# Patient Record
Sex: MALE | Race: AMERICAN INDIAN OR ALASKA NATIVE | NOT HISPANIC OR LATINO
[De-identification: names, ages, dates, MRNs, and addresses within clinical notes are randomized per-mention and may not be internally consistent; named-entity substitution may affect disease eponyms.]

---

## 2019-09-27 PROBLEM — Z00.00 ENCOUNTER FOR PREVENTIVE HEALTH EXAMINATION: Status: ACTIVE | Noted: 2019-09-27

## 2019-10-17 ENCOUNTER — APPOINTMENT (OUTPATIENT)
Dept: DERMATOLOGY | Facility: CLINIC | Age: 44
End: 2019-10-17
Payer: COMMERCIAL

## 2019-10-17 VITALS
WEIGHT: 135 LBS | DIASTOLIC BLOOD PRESSURE: 84 MMHG | SYSTOLIC BLOOD PRESSURE: 126 MMHG | HEIGHT: 65 IN | BODY MASS INDEX: 22.49 KG/M2

## 2019-10-17 DIAGNOSIS — L30.9 DERMATITIS, UNSPECIFIED: ICD-10-CM

## 2019-10-17 DIAGNOSIS — Z78.9 OTHER SPECIFIED HEALTH STATUS: ICD-10-CM

## 2019-10-17 DIAGNOSIS — L21.9 SEBORRHEIC DERMATITIS, UNSPECIFIED: ICD-10-CM

## 2019-10-17 DIAGNOSIS — L82.1 OTHER SEBORRHEIC KERATOSIS: ICD-10-CM

## 2019-10-17 PROCEDURE — 99203 OFFICE O/P NEW LOW 30 MIN: CPT

## 2019-10-17 RX ORDER — KETOCONAZOLE 20.5 MG/ML
2 SHAMPOO, SUSPENSION TOPICAL
Qty: 1 | Refills: 11 | Status: ACTIVE | COMMUNITY
Start: 2019-10-17 | End: 1900-01-01

## 2019-10-17 RX ORDER — CLOBETASOL PROPIONATE 0.5 MG/G
0.05 AEROSOL, FOAM TOPICAL
Qty: 1 | Refills: 1 | Status: ACTIVE | COMMUNITY
Start: 2019-10-17 | End: 1900-01-01

## 2020-04-26 ENCOUNTER — MESSAGE (OUTPATIENT)
Age: 45
End: 2020-04-26

## 2020-04-30 LAB
SARS-COV-2 IGG SERPL IA-ACNC: 0 INDEX
SARS-COV-2 IGG SERPL QL IA: NEGATIVE

## 2020-11-19 ENCOUNTER — TRANSCRIPTION ENCOUNTER (OUTPATIENT)
Age: 45
End: 2020-11-19

## 2024-06-04 ENCOUNTER — EMERGENCY (EMERGENCY)
Facility: HOSPITAL | Age: 49
LOS: 1 days | Discharge: HOME | End: 2024-06-04
Attending: EMERGENCY MEDICINE | Admitting: INTERNAL MEDICINE
Payer: COMMERCIAL

## 2024-06-04 VITALS
SYSTOLIC BLOOD PRESSURE: 153 MMHG | TEMPERATURE: 98 F | RESPIRATION RATE: 16 BRPM | HEART RATE: 91 BPM | DIASTOLIC BLOOD PRESSURE: 75 MMHG | OXYGEN SATURATION: 100 % | WEIGHT: 134.92 LBS | HEIGHT: 65 IN

## 2024-06-04 PROCEDURE — 99283 EMERGENCY DEPT VISIT LOW MDM: CPT

## 2024-06-04 PROCEDURE — 99284 EMERGENCY DEPT VISIT MOD MDM: CPT

## 2024-06-04 RX ORDER — EPINEPHRINE 0.3 MG/.3ML
0.3 INJECTION INTRAMUSCULAR; SUBCUTANEOUS
Qty: 2 | Refills: 0
Start: 2024-06-04

## 2024-06-04 RX ORDER — FAMOTIDINE 10 MG/ML
20 INJECTION INTRAVENOUS ONCE
Refills: 0 | Status: COMPLETED | OUTPATIENT
Start: 2024-06-04 | End: 2024-06-04

## 2024-06-04 RX ADMIN — Medication 60 MILLIGRAM(S): at 07:40

## 2024-06-04 RX ADMIN — FAMOTIDINE 20 MILLIGRAM(S): 10 INJECTION INTRAVENOUS at 07:40

## 2024-06-04 NOTE — ED PROVIDER NOTE - NSFOLLOWUPINSTRUCTIONS_ED_ALL_ED_FT
Continue Benadryl 50mg every 6 hours as needed      Food Allergy  Foods that are high in protein, including nuts, peanut butter, cheese, chicken, fish, beans, yogurt, and milk.  A food allergy is an abnormal reaction to a food (food allergen) by the body's defense system (immune system). Foods that commonly cause allergies include:  Milk.  Seafood.  Eggs.  Peanuts.  Wheat.  Soy.  Tree nuts such as pecans, walnuts, and cashews.  What are the causes?  Food allergies happen when the immune system sees a food as harmful and releases proteins (antibodies) to fight it.    What are the signs or symptoms?  Symptoms may be mild or severe. They usually start minutes after eating the food, but they can occur even a few hours later. In people with a severe allergy, symptoms can start within seconds.    Mild symptoms of this condition include:  Congested nose.  Tingling in the mouth.  An itchy, red rash.  Vomiting.  Diarrhea.  In people with a severe allergy, a life-threatening reaction can occur called anaphylaxis. Get help right away if you have symptoms of anaphylaxis, such as:  Feeling warm in the face (flushed). This may include redness.  Itchy, red, swollen areas of skin (hives).  Swelling of the eyes, lips, face, mouth, tongue, or throat.  Difficulty breathing, speaking, or swallowing.  Noisy breathing, high-pitched whistling sounds when you breathe, most often when you breathe out (wheezing).  Dizziness, light-headedness, or fainting.  Pain or cramping in the abdomen.  How is this diagnosed?  This condition may be diagnosed based on:  A physical exam.  Your medical history.  Skin tests.  Blood tests.  A food challenge test. This test involves eating the food that may be causing the allergic response while being monitored for a reaction by your health care provider.  The results of an elimination diet. The elimination diet involves removing foods from your diet and then adding them back in, one at a time.  A food diary.  How is this treated?  There is no cure for food allergies. Treatment focuses on preventing exposure to the food or foods you are allergic to and treating reactions if you are exposed to the food. Mild symptoms may not need treatment.    Severe reactions usually need to be treated at a hospital. Treatment may include:  Medicines that help:  Tighten your blood vessels (epinephrine).  Relieve itching and hives (antihistamines).  Widen the narrow and tight airways (bronchodilators).  Reduce swelling (corticosteroids).  Oxygen therapy to help you breathe.  IV fluids to keep you hydrated.  After a severe reaction, you may be given rescue medicines, such as:  An anaphylaxis kit.  An epinephrine injection, commonly called an auto-injector "pen" (pre-filled automatic epinephrine injection device).  Your health care provider may teach you how to use these if you are accidentally exposed to an allergen.    Follow these instructions at home:  If you have a potential allergy:    Follow the elimination diet as told by your health care provider.  Keep a food diary as told by your health care provider. Every day, write down:  What you eat and drink and when.  What symptoms you have and when.  If you have a severe allergy:    A person using an auto-injector pen in the thigh.  Wear a medical alert bracelet or necklace that describes your allergy.  Carry your anaphylaxis kit or an auto-injector pen with you at all times. Use them as told by your health care provider.  Make sure that you, your family members, and your employer know:  The signs of anaphylaxis.  How to use an anaphylaxis kit.  How to use an auto-injector pen.  If you think that you are having an anaphylactic reaction, use your auto-injector pen or anaphylaxis kit.  Replace your epinephrine immediately after you use your auto-injector pen. This is important if you have another reaction. If possible, carry two epinephrine auto-injector pens.  Get medical care after using your auto-injector pen. This is important because you can have a delayed, life-threatening reaction after taking the medicine (rebound anaphylaxis).  General instructions    Avoid the foods that you are allergic to.  Read food labels before you eat packaged items. Look for ingredients that you are allergic to.  When you are at a restaurant, tell your  that you have an allergy. If you are unsure whether a meal has an ingredient that you are allergic to, ask your .  Take over-the-counter and prescription medicines only as told by your health care provider.  Do not drive or operate machinery until your health care provider says that it is safe.  Inform all of your health care providers that you have a food allergy.  Work with your health care providers to make an anaphylaxis plan. Preparation is important.  If you think that you might be allergic to something else, talk with your health care provider. Do not eat a food to see if you are allergic to it without talking with your health care provider first.  Contact a health care provider if:  You have symptoms that do not go away within 2 days.  You have symptoms that get worse.  You have new symptoms.  Get help right away if you have symptoms of anaphylaxis:  Flushed skin.  Hives.  Swelling of the eyes, lips, face, mouth, tongue, or throat.  Difficulty breathing, speaking, or swallowing.  Wheezing.  Dizziness, light-headedness or fainting.  Pain or cramping in the abdomen.  These symptoms may represent a serious problem that is an emergency. Do not wait to see if the symptoms will go away. Use your auto-injector pen or anaphylaxis kit as you have been told. Get medical help right away. Call your local emergency services (911 in the [place] Do not drive yourself to the hospital.    If you needed to use an auto-injector pen, you need more medical care even if the medicine seems to be helping. This is important because anaphylaxis may happen again within 72 hours.    Summary  A food allergy is an abnormal reaction to a food (food allergen) by the body's defense system (immune system).  There is no cure for food allergies. Treatment focuses on preventing exposure to the food or foods you are allergic to and treating reactions if you are exposed to the food.  Wear a medical alert bracelet or necklace that describes your allergy.  If you have symptoms of anaphylaxis, use your auto-injector pen or anaphylaxis kit as you have been instructed, and get medical help right away.  This information is not intended to replace advice given to you by your health care provider. Make sure you discuss any questions you have with your health care provider.

## 2024-06-04 NOTE — ED PROVIDER NOTE - CHPI ED SYMPTOMS NEG
no cough/no difficulty swallowing/no nausea/no rash/no shortness of breath/no throat itching/no vomiting/no wheezing/no difficulty breathing

## 2024-06-04 NOTE — ED PROVIDER NOTE - DIFFERENTIAL DIAGNOSIS
Differential Diagnosis Differential including but not limited to Food allergy no systemic symptoms or evidence of anaphylaxis

## 2024-06-04 NOTE — ED ADULT NURSE NOTE - AS SC BRADEN FRICTION
Head,  normocephalic,  atraumatic,  Face,  Face within normal limits,  Ears,  External ears within normal limits,  Nose/Nasopharynx,  External nose  normal appearance,  nares patent,  no nasal discharge,  Mouth and Throat,  Oral cavity appearance normal,  Breath odor normal,  Lips,  Appearance normal
(3) no apparent problem

## 2024-06-04 NOTE — ED PROVIDER NOTE - CLINICAL SUMMARY MEDICAL DECISION MAKING FREE TEXT BOX
Patient with history of allergies to some pitted fruits complaining of swelling to his upper lip with small blister status post eating cherries yesterday approximately 12 noon.  Patient relates symptoms have not significantly worsened since then.  Patient relates lower lip not affected.  Patient denies difficulty breathing or swallowing chest pain shortness of breath cough and wheeze abdominal pain nausea vomiting diarrhea swelling or tightness to his tongue or throat itching or rash to his face torso or extremities or any other complaints.  Patient took Benadryl prior to arrival.  PMD Benjamin    Plan prednisone Pepcid discharged to follow-up as outpatient with continuing Benadryl and prescription for prednisone and EpiPen    Differential including but not limited to Food allergy no systemic symptoms or evidence of anaphylaxis

## 2024-06-04 NOTE — ED ADULT NURSE NOTE - NSFALLUNIVINTERV_ED_ALL_ED
Bed/Stretcher in lowest position, wheels locked, appropriate side rails in place/Call bell, personal items and telephone in reach/Instruct patient to call for assistance before getting out of bed/chair/stretcher/Non-slip footwear applied when patient is off stretcher/[place] to call system/Physically safe environment - no spills, clutter or unnecessary equipment/Purposeful proactive rounding/Room/bathroom lighting operational, light cord in reach

## 2024-06-04 NOTE — ED PROVIDER NOTE - ENMT, MLM
Airway patent, Mouth with small swelling to upper lip with approx 1cm fluid filled blister. Throat has no vesicles, no oropharyngeal exudates and uvula is midline. no swelling on tongue mouth or throat.

## 2024-06-04 NOTE — ED PROVIDER NOTE - OBJECTIVE STATEMENT
Patient with history of allergies to some pitted fruits complaining of swelling to his upper lip with small blister status post eating cherries yesterday approximately 12 noon.  Patient relates symptoms have not significantly worsened since then.  Patient relates lower lip not affected.  Patient denies difficulty breathing or swallowing chest pain shortness of breath cough and wheeze abdominal pain nausea vomiting diarrhea swelling or tightness to his tongue or throat itching or rash to his face torso or extremities or any other complaints.  Patient took Benadryl prior to arrival.  PMD Benjamin

## 2024-06-04 NOTE — ED ADULT NURSE NOTE - OBJECTIVE STATEMENT
49 y/o male received axo4 ambulatory c/o swelling to left upper lip today. pt suspects that it may be associated with eating cherries as he's had some allergic reactions in the past related to some fruits. nad/sob noted.

## 2024-06-04 NOTE — ED PROVIDER NOTE - PATIENT PORTAL LINK FT
You can access the FollowMyHealth Patient Portal offered by [facility] by registering at the following website: [email or web address]. By joining [employer]’s FollowMyHealth portal, you will also be able to view your health information using other applications (apps) compatible with our system.

## 2024-06-04 NOTE — ED PROVIDER NOTE - CARE PROVIDER_API CALL
Marcos Alexander  Internal Medicine  [address] [place] [address]  Phone: [phone]  Fax: [phone]  Follow Up Time: 1-3 Days
